# Patient Record
Sex: FEMALE | Race: WHITE | NOT HISPANIC OR LATINO
[De-identification: names, ages, dates, MRNs, and addresses within clinical notes are randomized per-mention and may not be internally consistent; named-entity substitution may affect disease eponyms.]

---

## 2018-08-17 PROBLEM — Z00.00 ENCOUNTER FOR PREVENTIVE HEALTH EXAMINATION: Status: ACTIVE | Noted: 2018-08-17

## 2018-08-21 ENCOUNTER — APPOINTMENT (OUTPATIENT)
Dept: UROLOGY | Facility: CLINIC | Age: 78
End: 2018-08-21
Payer: MEDICARE

## 2018-08-21 ENCOUNTER — APPOINTMENT (OUTPATIENT)
Dept: UROLOGY | Facility: CLINIC | Age: 78
End: 2018-08-21

## 2018-08-21 VITALS
HEART RATE: 68 BPM | WEIGHT: 160 LBS | RESPIRATION RATE: 16 BRPM | SYSTOLIC BLOOD PRESSURE: 140 MMHG | HEIGHT: 61 IN | BODY MASS INDEX: 30.21 KG/M2 | DIASTOLIC BLOOD PRESSURE: 82 MMHG | TEMPERATURE: 97.7 F | OXYGEN SATURATION: 97 %

## 2018-08-21 DIAGNOSIS — E78.5 HYPERLIPIDEMIA, UNSPECIFIED: ICD-10-CM

## 2018-08-21 DIAGNOSIS — T14.8XXA OTHER INJURY OF UNSPECIFIED BODY REGION, INITIAL ENCOUNTER: ICD-10-CM

## 2018-08-21 DIAGNOSIS — R30.0 DYSURIA: ICD-10-CM

## 2018-08-21 DIAGNOSIS — Z86.39 PERSONAL HISTORY OF OTHER ENDOCRINE, NUTRITIONAL AND METABOLIC DISEASE: ICD-10-CM

## 2018-08-21 DIAGNOSIS — N89.8 OTHER SPECIFIED NONINFLAMMATORY DISORDERS OF VAGINA: ICD-10-CM

## 2018-08-21 DIAGNOSIS — Z86.79 PERSONAL HISTORY OF OTHER DISEASES OF THE CIRCULATORY SYSTEM: ICD-10-CM

## 2018-08-21 PROCEDURE — 99204 OFFICE O/P NEW MOD 45 MIN: CPT

## 2018-08-21 RX ORDER — SODIUM BICARBONATE 650 MG/1
TABLET ORAL
Refills: 0 | Status: ACTIVE | COMMUNITY

## 2018-08-21 RX ORDER — METOPROLOL TARTRATE 75 MG/1
TABLET, FILM COATED ORAL
Refills: 0 | Status: ACTIVE | COMMUNITY

## 2018-08-21 RX ORDER — HYDROCORTISONE 0.5 G/100G
0.5 CREAM TOPICAL 3 TIMES DAILY
Qty: 1 | Refills: 0 | Status: ACTIVE | COMMUNITY
Start: 2018-08-21 | End: 1900-01-01

## 2018-08-21 RX ORDER — AMLODIPINE BESYLATE 5 MG/1
TABLET ORAL
Refills: 0 | Status: ACTIVE | COMMUNITY

## 2018-08-21 RX ORDER — LEVOTHYROXINE SODIUM 0.17 MG/1
TABLET ORAL
Refills: 0 | Status: ACTIVE | COMMUNITY

## 2018-08-21 RX ORDER — ATORVASTATIN CALCIUM 80 MG/1
TABLET, FILM COATED ORAL
Refills: 0 | Status: ACTIVE | COMMUNITY

## 2018-08-21 RX ORDER — LISINOPRIL 30 MG/1
TABLET ORAL
Refills: 0 | Status: ACTIVE | COMMUNITY

## 2018-08-23 LAB — BACTERIA UR CULT: NORMAL

## 2021-06-10 ENCOUNTER — EMERGENCY (EMERGENCY)
Facility: HOSPITAL | Age: 81
LOS: 1 days | Discharge: HOME | End: 2021-06-10
Attending: EMERGENCY MEDICINE
Payer: MEDICARE

## 2021-06-10 VITALS
WEIGHT: 134.92 LBS | SYSTOLIC BLOOD PRESSURE: 142 MMHG | DIASTOLIC BLOOD PRESSURE: 62 MMHG | HEART RATE: 55 BPM | TEMPERATURE: 98 F | HEIGHT: 62 IN | OXYGEN SATURATION: 95 % | RESPIRATION RATE: 22 BRPM

## 2021-06-10 VITALS
SYSTOLIC BLOOD PRESSURE: 157 MMHG | OXYGEN SATURATION: 95 % | TEMPERATURE: 98 F | HEART RATE: 58 BPM | RESPIRATION RATE: 18 BRPM | DIASTOLIC BLOOD PRESSURE: 53 MMHG

## 2021-06-10 LAB
ALBUMIN SERPL ELPH-MCNC: 4.6 G/DL — SIGNIFICANT CHANGE UP (ref 3.3–5)
ALP SERPL-CCNC: 79 U/L — SIGNIFICANT CHANGE UP (ref 40–120)
ALT FLD-CCNC: 9 U/L — LOW (ref 10–45)
ANION GAP SERPL CALC-SCNC: 15 MMOL/L — SIGNIFICANT CHANGE UP (ref 5–17)
AST SERPL-CCNC: 15 U/L — SIGNIFICANT CHANGE UP (ref 10–40)
BASOPHILS # BLD AUTO: 0.12 K/UL — SIGNIFICANT CHANGE UP (ref 0–0.2)
BASOPHILS NFR BLD AUTO: 0.9 % — SIGNIFICANT CHANGE UP (ref 0–2)
BILIRUB SERPL-MCNC: 0.7 MG/DL — SIGNIFICANT CHANGE UP (ref 0.2–1.2)
BUN SERPL-MCNC: 35 MG/DL — HIGH (ref 7–23)
CALCIUM SERPL-MCNC: 9.8 MG/DL — SIGNIFICANT CHANGE UP (ref 8.4–10.5)
CHLORIDE SERPL-SCNC: 102 MMOL/L — SIGNIFICANT CHANGE UP (ref 96–108)
CO2 SERPL-SCNC: 21 MMOL/L — LOW (ref 22–31)
CREAT SERPL-MCNC: 2.17 MG/DL — HIGH (ref 0.5–1.3)
EOSINOPHIL # BLD AUTO: 0.27 K/UL — SIGNIFICANT CHANGE UP (ref 0–0.5)
EOSINOPHIL NFR BLD AUTO: 2.1 % — SIGNIFICANT CHANGE UP (ref 0–6)
GLUCOSE SERPL-MCNC: 115 MG/DL — HIGH (ref 70–99)
HCT VFR BLD CALC: 39.5 % — SIGNIFICANT CHANGE UP (ref 34.5–45)
HGB BLD-MCNC: 12.9 G/DL — SIGNIFICANT CHANGE UP (ref 11.5–15.5)
IMM GRANULOCYTES NFR BLD AUTO: 0.5 % — SIGNIFICANT CHANGE UP (ref 0–1.5)
LYMPHOCYTES # BLD AUTO: 1.89 K/UL — SIGNIFICANT CHANGE UP (ref 1–3.3)
LYMPHOCYTES # BLD AUTO: 14.8 % — SIGNIFICANT CHANGE UP (ref 13–44)
MCHC RBC-ENTMCNC: 28.8 PG — SIGNIFICANT CHANGE UP (ref 27–34)
MCHC RBC-ENTMCNC: 32.7 GM/DL — SIGNIFICANT CHANGE UP (ref 32–36)
MCV RBC AUTO: 88.2 FL — SIGNIFICANT CHANGE UP (ref 80–100)
MONOCYTES # BLD AUTO: 0.86 K/UL — SIGNIFICANT CHANGE UP (ref 0–0.9)
MONOCYTES NFR BLD AUTO: 6.7 % — SIGNIFICANT CHANGE UP (ref 2–14)
NEUTROPHILS # BLD AUTO: 9.58 K/UL — HIGH (ref 1.8–7.4)
NEUTROPHILS NFR BLD AUTO: 75 % — SIGNIFICANT CHANGE UP (ref 43–77)
NRBC # BLD: 0 /100 WBCS — SIGNIFICANT CHANGE UP (ref 0–0)
PLATELET # BLD AUTO: 231 K/UL — SIGNIFICANT CHANGE UP (ref 150–400)
POTASSIUM SERPL-MCNC: 5 MMOL/L — SIGNIFICANT CHANGE UP (ref 3.5–5.3)
POTASSIUM SERPL-SCNC: 5 MMOL/L — SIGNIFICANT CHANGE UP (ref 3.5–5.3)
PROT SERPL-MCNC: 7.1 G/DL — SIGNIFICANT CHANGE UP (ref 6–8.3)
RBC # BLD: 4.48 M/UL — SIGNIFICANT CHANGE UP (ref 3.8–5.2)
RBC # FLD: 12.3 % — SIGNIFICANT CHANGE UP (ref 10.3–14.5)
SARS-COV-2 RNA SPEC QL NAA+PROBE: SIGNIFICANT CHANGE UP
SODIUM SERPL-SCNC: 138 MMOL/L — SIGNIFICANT CHANGE UP (ref 135–145)
WBC # BLD: 12.79 K/UL — HIGH (ref 3.8–10.5)
WBC # FLD AUTO: 12.79 K/UL — HIGH (ref 3.8–10.5)

## 2021-06-10 PROCEDURE — 80053 COMPREHEN METABOLIC PANEL: CPT

## 2021-06-10 PROCEDURE — 73110 X-RAY EXAM OF WRIST: CPT

## 2021-06-10 PROCEDURE — 73030 X-RAY EXAM OF SHOULDER: CPT

## 2021-06-10 PROCEDURE — 73110 X-RAY EXAM OF WRIST: CPT | Mod: 26,50

## 2021-06-10 PROCEDURE — 73130 X-RAY EXAM OF HAND: CPT | Mod: 26,50

## 2021-06-10 PROCEDURE — 85025 COMPLETE CBC W/AUTO DIFF WBC: CPT

## 2021-06-10 PROCEDURE — 99285 EMERGENCY DEPT VISIT HI MDM: CPT | Mod: 25

## 2021-06-10 PROCEDURE — 25605 CLTX DST RDL FX/EPHYS SEP W/: CPT | Mod: RT

## 2021-06-10 PROCEDURE — 73090 X-RAY EXAM OF FOREARM: CPT | Mod: 26,LT,76

## 2021-06-10 PROCEDURE — 90471 IMMUNIZATION ADMIN: CPT

## 2021-06-10 PROCEDURE — 73090 X-RAY EXAM OF FOREARM: CPT

## 2021-06-10 PROCEDURE — 73130 X-RAY EXAM OF HAND: CPT

## 2021-06-10 PROCEDURE — 90715 TDAP VACCINE 7 YRS/> IM: CPT

## 2021-06-10 PROCEDURE — 73060 X-RAY EXAM OF HUMERUS: CPT

## 2021-06-10 PROCEDURE — 73030 X-RAY EXAM OF SHOULDER: CPT | Mod: 26,LT

## 2021-06-10 PROCEDURE — U0003: CPT

## 2021-06-10 PROCEDURE — 99284 EMERGENCY DEPT VISIT MOD MDM: CPT | Mod: GC

## 2021-06-10 PROCEDURE — U0005: CPT

## 2021-06-10 PROCEDURE — 73060 X-RAY EXAM OF HUMERUS: CPT | Mod: 26,LT

## 2021-06-10 PROCEDURE — 73070 X-RAY EXAM OF ELBOW: CPT

## 2021-06-10 PROCEDURE — 73070 X-RAY EXAM OF ELBOW: CPT | Mod: 26,50

## 2021-06-10 RX ORDER — ACETAMINOPHEN 500 MG
975 TABLET ORAL ONCE
Refills: 0 | Status: COMPLETED | OUTPATIENT
Start: 2021-06-10 | End: 2021-06-10

## 2021-06-10 RX ORDER — TETANUS TOXOID, REDUCED DIPHTHERIA TOXOID AND ACELLULAR PERTUSSIS VACCINE, ADSORBED 5; 2.5; 8; 8; 2.5 [IU]/.5ML; [IU]/.5ML; UG/.5ML; UG/.5ML; UG/.5ML
0.5 SUSPENSION INTRAMUSCULAR ONCE
Refills: 0 | Status: COMPLETED | OUTPATIENT
Start: 2021-06-10 | End: 2021-06-10

## 2021-06-10 RX ORDER — IBUPROFEN 200 MG
600 TABLET ORAL ONCE
Refills: 0 | Status: DISCONTINUED | OUTPATIENT
Start: 2021-06-10 | End: 2021-06-10

## 2021-06-10 RX ADMIN — Medication 975 MILLIGRAM(S): at 12:17

## 2021-06-10 RX ADMIN — TETANUS TOXOID, REDUCED DIPHTHERIA TOXOID AND ACELLULAR PERTUSSIS VACCINE, ADSORBED 0.5 MILLILITER(S): 5; 2.5; 8; 8; 2.5 SUSPENSION INTRAMUSCULAR at 12:18

## 2021-06-10 NOTE — CONSULT NOTE ADULT - SUBJECTIVE AND OBJECTIVE BOX
80y Female L hand dominant presents c/o R wrist pain sp mechanical fall. Denies Headstrike/LOC. Denies numbness, tingling paresthesias in affected extremity. Able to ambulate after fall. No other orthopedic injuries at this time.    PAST MEDICAL & SURGICAL HISTORY:  Hypertension    Hypercholesterolemia    Hypothyroidism    CKD (chronic kidney disease)    No significant past surgical history      MEDICATIONS  (STANDING):    Allergies    No Known Allergies    Intolerances                            12.9   12.79 )-----------( 231      ( 10 Iftikhar 2021 12:29 )             39.5     10 Iftikhar 2021 12:29    138    |  102    |  35     ----------------------------<  115    5.0     |  21     |  2.17     Ca    9.8        10 Iftikhar 2021 12:29    TPro  7.1    /  Alb  4.6    /  TBili  0.7    /  DBili  x      /  AST  15     /  ALT  9      /  AlkPhos  79     10 Iftikhar 2021 12:29        Imaging: XR was personally reviewed and demonstrates a R distal radius fracture     Vital Signs Last 24 Hrs  T(C): 36.8 (06-10-21 @ 16:53), Max: 36.8 (06-10-21 @ 16:53)  T(F): 98.3 (06-10-21 @ 16:53), Max: 98.3 (06-10-21 @ 16:53)  HR: 64 (06-10-21 @ 16:53) (55 - 64)  BP: 157/61 (06-10-21 @ 16:53) (138/55 - 157/61)  BP(mean): --  RR: 16 (06-10-21 @ 16:53) (16 - 22)  SpO2: 99% (06-10-21 @ 16:53) (95% - 99%)  Gen: NAD  RUE: Skin intact, +gross deformity of the wrist, no ecchymosis, +ain/pin/m/r/u function, SILT C5-T1, radial pulse intact, compartments soft, brisk cap refill. DRUJ stable, no pain over the scaphoid, no ttp over elbow, full elbow sup/pro/flex/exten without pain    Secondary Survey: Full ROM of unaffected extremities, SILT globally, compartments soft, no bony TTP over bony prominences, no calf TTP, able to SLR with bilateral LE, no TTP along axial spine    Procedure: 10cc 1% lidocaine hematoma block injected under sterile procedure. The patient underwent closed reduction and placement of a long arm cast. Post procedure imaging demonstrates appropriately reduced distal radius. Post procedure exam demonstrated NV intact.    A/P: 80y Female w R distal radius fracture sp closed reduction and placement of a sugartong splint  Pain control  NWB RUE in sling for comfort   Keep cast clean and dry  Ice, elevate extremity  Active movement of fingers encouraged  Signs and symptoms of compartment syndrome were discussed with the patient and the family was advised to return to ED if suspected compartment syndrome  Possible need for surgical intervention in future discussed with patient  Follow up with Dr. Solo within 1 week, call office for appointment  Ortho stable for DC

## 2021-06-10 NOTE — ED PROVIDER NOTE - CLINICAL SUMMARY MEDICAL DECISION MAKING FREE TEXT BOX
81yo female with pmh htn, hld, ckd, hypothyroid, presenting after mechanical fall.  B/l foosh injuries with distal forearm, wrist pain.  No head trauma, ac use.  FROM of b/l LE and ambulatory after event.  Will check xrays to r/o fractures and reassess after imaging.

## 2021-06-10 NOTE — ED ADULT NURSE NOTE - NSFALLRSKASSESSDT_ED_ALL_ED
CHILDRENS ED PROVIDER NOTE form (s)  [x] Received []   Given []   Faxed []   Mailed  12/06/17 for provider []  Dr. Wesley [x]  Dr. Barrera []  Dr. Dale  []  Sugey Borden NP []  Provider  
CHILDRENS H&P BY CARMELLA CAM MD form (s)  [x] Received []   Given []   Faxed []   Mailed  12/06/17 for provider []  Dr. Wesley [x]  Dr. Barrera []  Dr. Dale  []  Sugey Borden NP []  Provider       
CHILDRENS NOTICE OF HOSPITAL PLACEMENT form (s)  [x] Received []   Given []   Faxed []   Mailed  12/06/17 for provider []  Dr. Wesley [x]  Dr. Barrera []  Dr. Dale  []  Sugye Borden NP []  Provider       
10-Iftikhar-2021 10:25

## 2021-06-10 NOTE — ED PROVIDER NOTE - ATTENDING CONTRIBUTION TO CARE
Attending Statement (JANNA Melissa MD):    HPI: 79y/o F with h/o HTN HLD CKD, presents to the ED s/p trip and fall while walking outside, stating that she tripped, fell forward onto knees and outstretched hands (both); with immediate pain in both arms; primarily pain in the right wrist and on the left between the elbow/shoulder.  States did not hit her head.  Reports scraping her knees but states no pain in the knees, nor any pain in the hips/ankles/feet.  No chest abdomen or neck pain.  States some lower back pain, but that this is chronic and unchanged.  Requesting only Tylenol at this time for pain.  No fever/chills, no nausea/vomiting, no cough/congestion, no diarrhea.    Review of Systems:  -General: no fever or chills  -ENT: no congestion, no difficulty swallowing  -Pulmonary: no cough, no shortness of breath  -Cardiac: no chest pain, no palpitations  -Gastrointestinal: no abdominal pain, no nausea, no vomiting, and no diarrhea.  -Genitourinary: no blood or pain with urination  -Musculoskeletal: no back or neck pain; upper extremity injuries as noted in hpi  -Skin: no rashes; abrasions to palms and knees bilaterally  -Endocrine: No h/o diabetes  -Neurologic: No focal weakness or numbness    All else negative unless otherwise specified elsewhere in this note.    PSH/PMH as noted above    On Physical Exam:  General: well appearing, in NAD, speaking clearly in full sentences and without difficulty; cooperative with exam  HEENT: PERRL, MMM  Neck: no neck tenderness, no nuchal rigidity  Cardiac: normal s1, s2; RRR; no MGR  Lungs: CTABL  Abdomen: soft nontender/nondistended  : no bladder tenderness or distension  Extremities:   -RUE: wrist/distal forearm deformity; radial pulse 2+, cap refill in fingers < 2 sec, sensation intact in fingers; able to   Neuro: no gross neurologic deficits: no facial asymmetry, able to move all extremities, A&Ox3/GCS 15.    MDM:

## 2021-06-10 NOTE — ED PROVIDER NOTE - OBJECTIVE STATEMENT
79yo female with pmh htn, hld, ckd, hypothyroid, resenting after fall.  8am this morning she was walking on sidewalk and tripped.  She fell forwards and had b/l foosh.  Endorsing pain of b/l wrist/hands R>L.  Also landed on b/l knees but no pain.  She denies loc, lightheadedness, head injury.  No AC.  Otherwsie feeling well prior to this.  States years prior had left forearm fracture and has known deformity.  No numbness, weakness. 79yo female with pmh htn, hld, ckd, hypothyroid, resenting after fall.  8am this morning she was walking on sidewalk and tripped.  She fell forwards and had b/l foosh.  Endorsing pain of b/l wrist/hands R>L.  Also landed on b/l knees but no pain.  She denies loc, lightheadedness, head injury.  No AC.  Otherwise feeling well prior to this.  States years prior had left forearm fracture and has known deformity.  No numbness, weakness.

## 2021-06-10 NOTE — ED ADULT NURSE NOTE - DRUG PRE-SCREENING (DAST -1)
Patient is calling regarding her upcoming procedure. Patient states that her blood work shows that she currently has a UTI and would like to know if she should call her urologists ans get an antibiotic or if Dr. Livier Estrada will prescribe one. Patient states that she morrison not want this cause any issues with her procedure and being able to move forward with it as scheduled.      Phone: [phone] Statement Selected

## 2021-06-10 NOTE — ED ADULT NURSE NOTE - OBJECTIVE STATEMENT
Pt bib daughter for eval of bilateral wrist pain, right is more swollen than left, cap refill is wnl bilaterally. Superficial  abrasions noted both knees, left is larger than right.  Pt tripped while going to the bus.  No loc, and is not on any anticoagulants.  Cols pack applied to right wrist.  Pt advised to be NPO pending x-ray eval.

## 2021-06-10 NOTE — ED ADULT NURSE REASSESSMENT NOTE - NS ED NURSE REASSESS COMMENT FT1
Received report from Kristen RN. Patient resting in bed, reporting b/l wrist pain, R worse than left. Patient aware of plan of care for imaging. Medicated for pain. Family at bedside, DAVIDS

## 2021-06-10 NOTE — ED PROVIDER NOTE - SHIFT CHANGE DETAILS
Attending note (Shin): I have endorsed this patient to the incoming physician, including clinical history, physical exam, current results and assessment/plan.

## 2021-06-10 NOTE — ED ADULT NURSE REASSESSMENT NOTE - NS ED NURSE REASSESS COMMENT FT1
Patient reporting increased pain to R wrist. Cold pack applied and will speak with MD about additional pain meds. Patient awaiting ortho.

## 2021-06-10 NOTE — ED PROVIDER NOTE - NSFOLLOWUPINSTRUCTIONS_ED_ALL_ED_FT
Follow up with orthopedics as directed  Rest, drink plenty of fluids  Advance activity as tolerated  Continue all previously prescribed medications as directed  Follow up with your PMD - bring copies of your results  Return to the ER for numbness, weakness, severe worsening pain, or other new or concerning symptoms   Take acetaminophen 975mg every 6 hours as needed for pain, avoid nsaids for history of kidney disease

## 2021-06-10 NOTE — ED PROVIDER NOTE - PROGRESS NOTE DETAILS
Attending note (Shin): patient found to have r collies fracture, no evidence of acute L wrist fracture (chronic fx).  given deformity, likely ligamentous injuries and R hand dominant; will consult ortho for recommendations, assistance with reduction. Cleared for Dc by Ortho. F/u outpatient Ortho at bedside for reduction Chante Cristobal MD - Attending Physician: Handoff received from Dr Melissa. Fall, R distal radius/ulnar styloid fracture. Ortho c/s for reduction and follow-up

## 2021-06-10 NOTE — ED PROVIDER NOTE - PATIENT PORTAL LINK FT
You can access the FollowMyHealth Patient Portal offered by [facility] by registering at the following website: [email or web address]. By joining [employer]’s FollowMyHealth portal, you will also be able to view your health information using other applications (apps) compatible with our system.

## 2021-06-10 NOTE — ED PROVIDER NOTE - PHYSICAL EXAMINATION
General appearance: NAD, conversant, afebrile    Eyes: anicteric sclerae, ARCENIO, EOMI   HENT: Atraumatic; oropharynx clear, MMM and no ulcerations, no pharyngeal erythema or exudate   Neck: Trachea midline; Full range of motion, supple, no midline tenderness   Pulm: CTA bl, normal respiratory effort and no intercostal retractions, normal work of breathing   CV: RRR, No murmurs, rubs, or gallops   Back: No midline tenderness   Abdomen: Soft, non-tender, non-distended; no guarding or rebound   Extremities: No peripheral edema, FROM b/l LE, limited ROM of b/l wrists, deformities of b/l distal forearms, gross sensation intact, 5/5 strength in all four extremities   Skin: Dry, normal temperature, turgor and texture; no rash, abrasions to b/l palms, anterior knees   Psych: Appropriate affect, cooperative; alert and oriented to person, place and time

## 2021-06-11 NOTE — ED POST DISCHARGE NOTE - ADDITIONAL DOCUMENTATION
Patient diagnosed with a distal radius fracture in the ED.  Ortho consulted and reduction and splinting perfomred by ortho.  Appropriate care provided in the ER.  No need for further patient contact at this time.  -Bogdan Rouse PA-C

## 2023-07-09 NOTE — ED ADULT NURSE NOTE - CHPI ED NUR RELIEVING FX
Unclear when pt had his last drink. EtOH was undetectable c/f possible alcohol withdrawal or delirium tremens. He has required multiple doses of mostly benzodiazepines to keep his agitation under control.     · CIWA protocol   · PETH, ethanol    · LFTs   · Thiamine   · Folate  · Prn valium    ice